# Patient Record
Sex: FEMALE | Race: AMERICAN INDIAN OR ALASKA NATIVE
[De-identification: names, ages, dates, MRNs, and addresses within clinical notes are randomized per-mention and may not be internally consistent; named-entity substitution may affect disease eponyms.]

---

## 2018-12-30 ENCOUNTER — HOSPITAL ENCOUNTER (EMERGENCY)
Dept: HOSPITAL 5 - ED | Age: 38
Discharge: HOME | End: 2018-12-30
Payer: MEDICAID

## 2018-12-30 VITALS — DIASTOLIC BLOOD PRESSURE: 72 MMHG | SYSTOLIC BLOOD PRESSURE: 114 MMHG

## 2018-12-30 DIAGNOSIS — Z88.0: ICD-10-CM

## 2018-12-30 DIAGNOSIS — F17.200: ICD-10-CM

## 2018-12-30 DIAGNOSIS — M17.0: ICD-10-CM

## 2018-12-30 DIAGNOSIS — Z91.048: ICD-10-CM

## 2018-12-30 DIAGNOSIS — Z88.6: ICD-10-CM

## 2018-12-30 DIAGNOSIS — S67.01XA: ICD-10-CM
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
## 2018-12-30 DIAGNOSIS — S61.011A: Primary | ICD-10-CM

## 2018-12-30 DIAGNOSIS — Z88.2: ICD-10-CM
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
## 2018-12-30 NOTE — EMERGENCY DEPARTMENT REPORT
ED Extremity Problem HPI





- General


Chief complaint: Extremity Injury, Upper


Stated complaint: RT THUMB SLAMMED IN DOOR


Time Seen by Provider: 18 12:57


Source: patient


Mode of arrival: Ambulatory


Limitations: No Limitations





- History of Present Illness


Initial comments: 





accidently slammed thumb in door





MD Complaint: extremity pain, extremity swelling, joint paint


-: Sudden, Last night (around 18:30 c/o of continued throbbing pain and some 

bleeding off and on. )


Location: right


History of Same: No





- Related Data


                                Home Medications











 Medication  Instructions  Recorded  Confirmed  Last Taken


 


Norethindrone [Desirae] 0.35 mg PO QDAY 16 13:00








                                  Previous Rx's











 Medication  Instructions  Recorded  Last Taken  Type


 


Acetaminophen/Codeine [Tylenol #3] 1 tab PO Q6H PRN #15 tab 16 Unknown Rx


 


valACYclovir [Valtrex] 500 mg PO BID #6 tab 16 Unknown Rx


 


Clindamycin [Clindamycin CAP] 150 mg PO Q6HR #40 capsule 18 Unknown Rx











                                    Allergies











Allergy/AdvReac Type Severity Reaction Status Date / Time


 


aspirin Allergy  Swelling Verified 16 11:02


 


ibuprofen [From Motrin] Allergy  Swelling Verified 16 11:02


 


methylprednisolone Allergy  Hives Verified 18 11:43





[From Depo-Medrol]     


 


penicillin Allergy  Shortness Verified 16 11:02





   of Breath  


 


RUBBING ALCOHOL AdvReac  Rash Uncoded 16 11:02














ED Review of Systems


ROS: 


Stated complaint: RT THUMB SLAMMED IN DOOR


Other details as noted in HPI





Constitutional: denies: chills, fever


Eyes: denies: eye pain, eye discharge, vision change


ENT: denies: ear pain, throat pain


Respiratory: denies: cough, shortness of breath, wheezing


Cardiovascular: denies: chest pain, palpitations


Endocrine: no symptoms reported


Gastrointestinal: denies: abdominal pain, nausea, diarrhea


Genitourinary: denies: urgency, dysuria, discharge


Musculoskeletal: arthralgia.  denies: back pain, joint swelling


Skin: denies: rash, lesions


Neurological: denies: headache, weakness, paresthesias


Psychiatric: denies: anxiety, depression


Hematological/Lymphatic: denies: easy bleeding, easy bruising





ED Past Medical Hx





- Past Medical History


Hx Arthritis: Yes (KNEES)


Additional medical history: DDD.  SCIATICA.  "NERVE DAMAGE".  HERPES





- Surgical History


Additional Surgical History:  X 4.  LEFT KNEE X 2.  RIGHT KNEE





- Social History


Smoking Status: Current Some Day Smoker


Substance Use Type: Alcohol





- Medications


Home Medications: 


                                Home Medications











 Medication  Instructions  Recorded  Confirmed  Last Taken  Type


 


Acetaminophen/Codeine [Tylenol #3] 1 tab PO Q6H PRN #15 tab 16  Unknown Rx


 


Norethindrone [Desirae] 0.35 mg PO QDAY 16 13:00 History


 


valACYclovir [Valtrex] 500 mg PO BID #6 tab 16  Unknown Rx


 


Clindamycin [Clindamycin CAP] 150 mg PO Q6HR #40 capsule 18  Unknown Rx














ED Physical Exam





- General


Limitations: No Limitations


General appearance: alert, in no apparent distress





- Head


Head exam: Present: atraumatic, normocephalic





- Eye


Eye exam: Present: normal appearance





- ENT


ENT exam: Present: mucous membranes moist





- Neck


Neck exam: Present: normal inspection





- Respiratory


Respiratory exam: Present: normal lung sounds bilaterally.  Absent: respiratory 

distress, wheezes, rales, rhonchi, chest wall tenderness, decreased breath 

sounds





- Cardiovascular


Cardiovascular Exam: Present: regular rate, normal rhythm.  Absent: systolic 

murmur, diastolic murmur, rubs, gallop





- GI/Abdominal


GI/Abdominal exam: Present: soft, normal bowel sounds





- Extremities Exam


Extremities exam: Present: normal inspection, tenderness, normal capillary 

refill, other (swollen left thumb with less than 1 cm tear laceration to 

latgeral aspect. full rom. no limitation. cap refill brisk. )





- Back Exam


Back exam: Present: normal inspection.  Absent: CVA tenderness (R), CVA 

tenderness (L)





- Neurological Exam


Neurological exam: Present: alert, oriented X3, CN II-XII intact





- Psychiatric


Psychiatric exam: Present: normal affect, normal mood





- Skin


Skin exam: Present: warm, dry, intact, normal color.  Absent: rash





ED Course





                                   Vital Signs











  18





  11:44


 


Temperature 98.4 F


 


Pulse Rate 72


 


Respiratory 18





Rate 


 


Blood Pressure 114/72


 


O2 Sat by Pulse 100





Oximetry 














- Reevaluation(s)


Reevaluation #1: 





18 13:10


thmb splinted and dressed wtih antimicrobal dressing. 


Critical care attestation.: 


If time is entered above; I have spent that time in minutes in the direct care 

of this critically ill patient, excluding procedure time.








ED Disposition


Clinical Impression: 


 Crush injury, Laceration





Disposition: DC-01 TO HOME OR SELFCARE


Is pt being admited?: No


Does the pt Need Aspirin: No


Condition: Stable


Instructions:  Laceration (ED), Finger Laceration (ED), Splint Care (ED)


Referrals: 


AARTI SHETTY MD [Primary Care Provider] - 3-5 Days

## 2018-12-30 NOTE — XRAY REPORT
FINAL REPORT



EXAM:  XR FINGER(S) 2+V RT



HISTORY:  PAIN IN RT THUMB/TRAUMA 



TECHNIQUE:  Frontal radiograph of the right hand including oblique and lateral radiographs of the rig
ht thumb.



PRIORS:  None.



FINDINGS:  

There is slight deformity of the tuft of the right thumb. No dislocation. Normal mineralization. Ther
e is soft tissue swelling of the right thumb. Overlying bandage material is present. No degenerative 
change. 



IMPRESSION:  

Possible nondisplaced right thumb tuft fracture.

## 2022-02-06 ENCOUNTER — HOSPITAL ENCOUNTER (EMERGENCY)
Dept: HOSPITAL 5 - ED | Age: 42
Discharge: HOME | End: 2022-02-06
Payer: MEDICAID

## 2022-02-06 VITALS — DIASTOLIC BLOOD PRESSURE: 62 MMHG | SYSTOLIC BLOOD PRESSURE: 109 MMHG

## 2022-02-06 DIAGNOSIS — O26.891: Primary | ICD-10-CM

## 2022-02-06 DIAGNOSIS — F17.200: ICD-10-CM

## 2022-02-06 DIAGNOSIS — Z88.0: ICD-10-CM

## 2022-02-06 DIAGNOSIS — Z88.6: ICD-10-CM

## 2022-02-06 DIAGNOSIS — R10.13: ICD-10-CM

## 2022-02-06 DIAGNOSIS — Z3A.01: ICD-10-CM

## 2022-02-06 LAB
ALBUMIN SERPL-MCNC: 4 G/DL (ref 3.9–5)
ALT SERPL-CCNC: 17 UNITS/L (ref 7–56)
BASOPHILS # (AUTO): 0 K/MM3 (ref 0–0.1)
BASOPHILS NFR BLD AUTO: 0.7 % (ref 0–1.8)
BILIRUB UR QL STRIP: (no result)
BLOOD UR QL VISUAL: (no result)
BUN SERPL-MCNC: 12 MG/DL (ref 7–17)
BUN/CREAT SERPL: 15 %
CALCIUM SERPL-MCNC: 9.2 MG/DL (ref 8.4–10.2)
EOSINOPHIL # BLD AUTO: 0 K/MM3 (ref 0–0.4)
EOSINOPHIL NFR BLD AUTO: 1 % (ref 0–4.3)
HCT VFR BLD CALC: 35.5 % (ref 30.3–42.9)
HEMOLYSIS INDEX: 2
HGB BLD-MCNC: 11.2 GM/DL (ref 10.1–14.3)
LYMPHOCYTES # BLD AUTO: 1.4 K/MM3 (ref 1.2–5.4)
LYMPHOCYTES NFR BLD AUTO: 34.9 % (ref 13.4–35)
MCHC RBC AUTO-ENTMCNC: 32 % (ref 30–34)
MCV RBC AUTO: 75 FL (ref 79–97)
MONOCYTES # (AUTO): 0.3 K/MM3 (ref 0–0.8)
MONOCYTES % (AUTO): 7.9 % (ref 0–7.3)
MUCOUS THREADS #/AREA URNS HPF: (no result) /HPF
PH UR STRIP: 5 [PH] (ref 5–7)
PLATELET # BLD: 238 K/MM3 (ref 140–440)
PROT UR STRIP-MCNC: (no result) MG/DL
RBC # BLD AUTO: 4.75 M/MM3 (ref 3.65–5.03)
RBC #/AREA URNS HPF: < 1 /HPF (ref 0–6)
UROBILINOGEN UR-MCNC: < 2 MG/DL (ref ?–2)
WBC #/AREA URNS HPF: 2 /HPF (ref 0–6)

## 2022-02-06 PROCEDURE — 85025 COMPLETE CBC W/AUTO DIFF WBC: CPT

## 2022-02-06 PROCEDURE — 36415 COLL VENOUS BLD VENIPUNCTURE: CPT

## 2022-02-06 PROCEDURE — 83690 ASSAY OF LIPASE: CPT

## 2022-02-06 PROCEDURE — 84703 CHORIONIC GONADOTROPIN ASSAY: CPT

## 2022-02-06 PROCEDURE — 81001 URINALYSIS AUTO W/SCOPE: CPT

## 2022-02-06 PROCEDURE — 99284 EMERGENCY DEPT VISIT MOD MDM: CPT

## 2022-02-06 PROCEDURE — 80053 COMPREHEN METABOLIC PANEL: CPT

## 2022-02-06 PROCEDURE — 84702 CHORIONIC GONADOTROPIN TEST: CPT

## 2022-02-06 PROCEDURE — 76801 OB US < 14 WKS SINGLE FETUS: CPT

## 2022-02-06 NOTE — EMERGENCY DEPARTMENT REPORT
ED General Adult HPI





- General


Chief complaint: Abdominal Pain


Stated complaint: AB PAIN/NAUSEA/FATIGUE


Time Seen by Provider: 22 08:15


Source: patient


Mode of arrival: Ambulatory


Limitations: No Limitations





- History of Present Illness


Initial comments: 





41-year-old -American female patient with history of bipolar disorder 

presents with complaints of abdominal pain, bloating, and nausea x2 days.  She 

also reports that she took several pregnancy tests that came back inconclusive. 

She denies any vaginal bleeding, dyspareunia, vaginal discharge, 

hematuria/urinary frequency, vomiting, melena, hematochezia, or diarrhea.  No 

chest pain or cough/shortness of breath per patient.  She has not tried any OTC 

medications for symptoms.  Patient also denies any fever/chills/sweats


Severity scale (0 -10): 7





- Related Data


                                Home Medications











 Medication  Instructions  Recorded  Confirmed  Last Taken


 


Norethindrone [Desirae] 0.35 mg PO QDAY 16 13:00








                                  Previous Rx's











 Medication  Instructions  Recorded  Last Taken  Type


 


Acetaminophen/Codeine [Tylenol #3] 1 tab PO Q6H PRN #15 tab 16 Unknown Rx


 


valACYclovir [Valtrex] 500 mg PO BID #6 tab 16 Unknown Rx


 


Clindamycin [Clindamycin CAP] 150 mg PO Q6HR #40 capsule 18 Unknown Rx


 


Famotidine [Pepcid] 20 mg PO BID PRN #20 tablet 22 Unknown Rx


 


Metoclopramide [Reglan] 10 mg PO TID PRN #30 tab 22 Unknown Rx


 


diphenhydrAMINE [Benadryl CAP] 25 mg PO TID PRN #30 capsule 22 Unknown Rx











                                    Allergies











Allergy/AdvReac Type Severity Reaction Status Date / Time


 


aspirin Allergy  Swelling Verified 16 11:02


 


ibuprofen [From Motrin] Allergy  Swelling Verified 16 11:02


 


methylprednisolone Allergy  Hives Verified 18 11:43





[From Depo-Medrol]     


 


penicillin Allergy  Shortness Verified 16 11:02





   of Breath  


 


RUBBING ALCOHOL AdvReac  Rash Uncoded 16 11:02














ED Review of Systems


ROS: 


Stated complaint: AB PAIN/NAUSEA/FATIGUE


Other details as noted in HPI





Constitutional: denies: chills, fever, malaise


Respiratory: denies: cough, shortness of breath


Cardiovascular: denies: chest pain


Gastrointestinal: abdominal pain, nausea.  denies: vomiting, diarrhea, 

constipation, hematemesis, melena, hematochezia


Genitourinary: frequency.  denies: urgency, dysuria, hematuria, discharge, 

abnormal menses


Skin: denies: rash, lesions, change in color


Neurological: denies: headache


Hematological/Lymphatic: denies: easy bleeding, swollen glands





ED Past Medical Hx





- Past Medical History


Previous Medical History?: Yes


Hx Arthritis: Yes (KNEES)


Hx Psychiatric Treatment: Yes (Bi-Polar 2)


Additional medical history: DDD.  SCIATICA.  "NERVE DAMAGE".  HERPES.  herniated

discs





- Surgical History


Past Surgical History?: Yes


Additional Surgical History:  X 4.  LEFT KNEE X 2.  RIGHT KNEE





- Social History


Smoking Status: Current Some Day Smoker


Substance Use Type: Alcohol





- Medications


Home Medications: 


                                Home Medications











 Medication  Instructions  Recorded  Confirmed  Last Taken  Type


 


Acetaminophen/Codeine [Tylenol #3] 1 tab PO Q6H PRN #15 tab 16  Unknown Rx


 


Norethindrone [Desirae] 0.35 mg PO QDAY 16 13:00 History


 


valACYclovir [Valtrex] 500 mg PO BID #6 tab 16  Unknown Rx


 


Clindamycin [Clindamycin CAP] 150 mg PO Q6HR #40 capsule 18  Unknown Rx


 


Famotidine [Pepcid] 20 mg PO BID PRN #20 tablet 22  Unknown Rx


 


Metoclopramide [Reglan] 10 mg PO TID PRN #30 tab 22  Unknown Rx


 


diphenhydrAMINE [Benadryl CAP] 25 mg PO TID PRN #30 capsule 22  Unknown Rx














ED Physical Exam





- General


Limitations: No Limitations


General appearance: alert, in no apparent distress, obese





- Head


Head exam: Present: atraumatic, normocephalic





- Eye


Eye exam: Present: normal appearance





- Neck


Neck exam: Present: normal inspection





- Respiratory


Respiratory exam: Present: normal lung sounds bilaterally.  Absent: respiratory 

distress





- Cardiovascular


Cardiovascular Exam: Present: regular rate, normal rhythm





- GI/Abdominal


GI/Abdominal exam: Present: soft, tenderness (Epigastric and periumbilical), 

normal bowel sounds.  Absent: distended, guarding, rebound, rigid, other 

(Negative Napoles sign; no McBurney point tenderness)





- Back Exam


Back exam: Present: full ROM.  Absent: CVA tenderness (R), CVA tenderness (L)





- Neurological Exam


Neurological exam: Present: alert, oriented X3, normal gait





- Psychiatric


Psychiatric exam: Present: normal affect, normal mood





- Skin


Skin exam: Present: warm, dry, intact, normal color.  Absent: rash





ED Course


                                   Vital Signs











  22





  08:07


 


Temperature 98.5 F


 


Pulse Rate 74


 


Respiratory 16





Rate 


 


Blood Pressure 124/77





[Right] 


 


O2 Sat by Pulse 100





Oximetry 














ED Medical Decision Making





- Lab Data


Result diagrams: 


                                 22 08:34





                                 22 08:34








                                   Lab Results











  22 Range/Units





  08:34 08:34 08:34 


 


WBC  4.0 L    (4.5-11.0)  K/mm3


 


RBC  4.75    (3.65-5.03)  M/mm3


 


Hgb  11.2    (10.1-14.3)  gm/dl


 


Hct  35.5    (30.3-42.9)  %


 


MCV  75 L    (79-97)  fl


 


MCH  24 L    (28-32)  pg


 


MCHC  32    (30-34)  %


 


RDW  15.9 H    (13.2-15.2)  %


 


Plt Count  238    (140-440)  K/mm3


 


Lymph % (Auto)  34.9    (13.4-35.0)  %


 


Mono % (Auto)  7.9 H    (0.0-7.3)  %


 


Eos % (Auto)  1.0    (0.0-4.3)  %


 


Baso % (Auto)  0.7    (0.0-1.8)  %


 


Lymph # (Auto)  1.4    (1.2-5.4)  K/mm3


 


Mono # (Auto)  0.3    (0.0-0.8)  K/mm3


 


Eos # (Auto)  0.0    (0.0-0.4)  K/mm3


 


Baso # (Auto)  0.0    (0.0-0.1)  K/mm3


 


Seg Neutrophils %  55.5    (40.0-70.0)  %


 


Seg Neutrophils #  2.2    (1.8-7.7)  K/mm3


 


Sodium   134 L   (137-145)  mmol/L


 


Potassium   4.4   (3.6-5.0)  mmol/L


 


Chloride   103.9   ()  mmol/L


 


Carbon Dioxide   20 L   (22-30)  mmol/L


 


Anion Gap   15   mmol/L


 


BUN   12   (7-17)  mg/dL


 


Creatinine   0.8   (0.6-1.2)  mg/dL


 


Estimated GFR   > 60   ml/min


 


BUN/Creatinine Ratio   15   %


 


Glucose   90   ()  mg/dL


 


Calcium   9.2   (8.4-10.2)  mg/dL


 


Total Bilirubin   0.20   (0.1-1.2)  mg/dL


 


AST   18   (5-40)  units/L


 


ALT   17   (7-56)  units/L


 


Alkaline Phosphatase   44   ()  units/L


 


Total Protein   6.9   (6.3-8.2)  g/dL


 


Albumin   4.0   (3.9-5)  g/dL


 


Albumin/Globulin Ratio   1.4   %


 


Lipase   39   (13-60)  units/L


 


HCG, Qual    Positive  (Negative)  


 


HCG, Quant     (0-4)  mIU/mL


 


Urine Color     (Yellow)  


 


Urine Turbidity     (Clear)  


 


Urine pH     (5.0-7.0)  


 


Ur Specific Gravity     (1.003-1.030)  


 


Urine Protein     (Negative)  mg/dL


 


Urine Glucose (UA)     (Negative)  mg/dL


 


Urine Ketones     (Negative)  mg/dL


 


Urine Blood     (Negative)  


 


Urine Nitrite     (Negative)  


 


Urine Bilirubin     (Negative)  


 


Urine Urobilinogen     (<2.0)  mg/dL


 


Ur Leukocyte Esterase     (Negative)  


 


Urine WBC (Auto)     (0.0-6.0)  /HPF


 


Urine RBC (Auto)     (0.0-6.0)  /HPF


 


U Epithel Cells (Auto)     (0-13.0)  /HPF


 


Urine Mucus     /HPF














  22 Range/Units





  08:34 Unknown 


 


WBC    (4.5-11.0)  K/mm3


 


RBC    (3.65-5.03)  M/mm3


 


Hgb    (10.1-14.3)  gm/dl


 


Hct    (30.3-42.9)  %


 


MCV    (79-97)  fl


 


MCH    (28-32)  pg


 


MCHC    (30-34)  %


 


RDW    (13.2-15.2)  %


 


Plt Count    (140-440)  K/mm3


 


Lymph % (Auto)    (13.4-35.0)  %


 


Mono % (Auto)    (0.0-7.3)  %


 


Eos % (Auto)    (0.0-4.3)  %


 


Baso % (Auto)    (0.0-1.8)  %


 


Lymph # (Auto)    (1.2-5.4)  K/mm3


 


Mono # (Auto)    (0.0-0.8)  K/mm3


 


Eos # (Auto)    (0.0-0.4)  K/mm3


 


Baso # (Auto)    (0.0-0.1)  K/mm3


 


Seg Neutrophils %    (40.0-70.0)  %


 


Seg Neutrophils #    (1.8-7.7)  K/mm3


 


Sodium    (137-145)  mmol/L


 


Potassium    (3.6-5.0)  mmol/L


 


Chloride    ()  mmol/L


 


Carbon Dioxide    (22-30)  mmol/L


 


Anion Gap    mmol/L


 


BUN    (7-17)  mg/dL


 


Creatinine    (0.6-1.2)  mg/dL


 


Estimated GFR    ml/min


 


BUN/Creatinine Ratio    %


 


Glucose    ()  mg/dL


 


Calcium    (8.4-10.2)  mg/dL


 


Total Bilirubin    (0.1-1.2)  mg/dL


 


AST    (5-40)  units/L


 


ALT    (7-56)  units/L


 


Alkaline Phosphatase    ()  units/L


 


Total Protein    (6.3-8.2)  g/dL


 


Albumin    (3.9-5)  g/dL


 


Albumin/Globulin Ratio    %


 


Lipase    (13-60)  units/L


 


HCG, Qual    (Negative)  


 


HCG, Quant  34023 H   (0-4)  mIU/mL


 


Urine Color   Yellow  (Yellow)  


 


Urine Turbidity   Clear  (Clear)  


 


Urine pH   5.0  (5.0-7.0)  


 


Ur Specific Gravity   1.029  (1.003-1.030)  


 


Urine Protein   <15 mg/dl  (Negative)  mg/dL


 


Urine Glucose (UA)   Neg  (Negative)  mg/dL


 


Urine Ketones   Tr  (Negative)  mg/dL


 


Urine Blood   Neg  (Negative)  


 


Urine Nitrite   Neg  (Negative)  


 


Urine Bilirubin   Neg  (Negative)  


 


Urine Urobilinogen   < 2.0  (<2.0)  mg/dL


 


Ur Leukocyte Esterase   Neg  (Negative)  


 


Urine WBC (Auto)   2.0  (0.0-6.0)  /HPF


 


Urine RBC (Auto)   < 1.0  (0.0-6.0)  /HPF


 


U Epithel Cells (Auto)   4.0  (0-13.0)  /HPF


 


Urine Mucus   3+  /HPF














- Radiology Data


Radiology results: report reviewed








 


ULTRASOUND OBSTETRIC   


 


 INDICATION / CLINICAL INFORMATION: pain in pregnancy. Pelvic pain/cramping.  


 - Clinical Gestational Age (GA) in weeks, days: 6, 5   


 


 TECHNIQUE: Transabdominal.  


 


 COMPARISON: None available.  


 


 FINDINGS:  


 GESTATIONAL SAC: Well-defined oval shape and intrauterine in location.   


 YOLK SAC: No significant abnormality.  


 


 EMBRYO/FETUS: No significant abnormality.   


 - Crown-Rump Length = 0.7 cm = 6, 4 weeks, days  


 - Fetal Heart Rate, beats per minute (if present) = 139  


 


 ADNEXA: No significant abnormality.  


 FREE FLUID: None.  


 


 ADDITIONAL FINDINGS: None.  


 


 IMPRESSION:  


 1. Single, living intrauterine pregnancy with estimated sonographic age of 6, 4

weeks, days.  No 


acute sonographic abnormality.  


 





- Medical Decision Making








41-year-old -American female patient with history of bipolar disorder 

presents with complaints of abdominal pain, bloating, and nausea x2 days.  She 

also reports that she took several pregnancy tests that came back inconclusive. 

She denies any vaginal bleeding, dyspareunia, vaginal discharge, 

hematuria/urinary frequency, vomiting, melena, hematochezia, or diarrhea.  No 

chest pain or cough/shortness of breath per patient.  She has not tried any OTC 

medications for symptoms.  Patient also denies any fever/chills/sweats








Positive pregnancy test.  Ultrasound shows 6-week 4-day IUP.  Mildly decreased 

sodium on CMP without other acute abnormalities.  Patient declines fluids and 

meds.  Vitals are within normal limits and patient is well-appearing.  She is 

stable for discharge home.  Reglan Benadryl given for home for nausea.  Patient 

given referrals for OB/GYN and informed to follow-up within 1 to 2 weeks.  

Strict return precautions were discussed in detail with patient who verbalizes 

understanding


Critical care attestation.: 


If time is entered above; I have spent that time in minutes in the direct care 

of this critically ill patient, excluding procedure time.








ED Disposition


Clinical Impression: 


 Epigastric abdominal pain during pregnancy in first trimester





Disposition:  HOME / SELF CARE / HOMELESS


Is pt being admited?: No


Condition: Stable


Instructions:  Abdominal Pain (ED), Abdominal Pain During Pregnancy, Activity 

Restriction During Pregnancy


Prescriptions: 


diphenhydrAMINE [Benadryl CAP] 25 mg PO TID PRN #30 capsule


 PRN Reason: Nausea


Famotidine [Pepcid] 20 mg PO BID PRN #20 tablet


 PRN Reason: Heartburn


Metoclopramide [Reglan] 10 mg PO TID PRN #30 tab


 PRN Reason: Nausea


Referrals: 


[facility] [Primary Care Provider] - 3-5 Days


[facility] [Provider Group] - 3-5 Days


[facility] [Provider Group] - 3-5 Days


[facility] [Provider Group] - 3-5 Days


Forms:  Work/School Release Form(ED)

## 2022-02-06 NOTE — ULTRASOUND REPORT
ULTRASOUND OBSTETRIC 



INDICATION / CLINICAL INFORMATION: pain in pregnancy. Pelvic pain/cramping.

- Clinical Gestational Age (GA) in weeks, days: 6, 5 



TECHNIQUE: Transabdominal.



COMPARISON: None available.



FINDINGS:

GESTATIONAL SAC: Well-defined oval shape and intrauterine in location. 

YOLK SAC: No significant abnormality.



EMBRYO/FETUS: No significant abnormality. 

- Crown-Rump Length = 0.7 cm = 6, 4 weeks, days

- Fetal Heart Rate, beats per minute (if present) = 139



ADNEXA: No significant abnormality.

FREE FLUID: None.



ADDITIONAL FINDINGS: None.



IMPRESSION:

1. Single, living intrauterine pregnancy with estimated sonographic age of 6, 4 weeks, days.  No acut
e sonographic abnormality.



Signer Name: CT Brown MD 

Signed: 2/6/2022 12:43 PM

Workstation Name: [id number]

## 2022-09-13 ENCOUNTER — HOSPITAL ENCOUNTER (EMERGENCY)
Dept: HOSPITAL 5 - ED | Age: 42
LOS: 1 days | Discharge: HOME | End: 2022-09-14
Payer: MEDICAID

## 2022-09-13 DIAGNOSIS — M25.561: Primary | ICD-10-CM

## 2022-09-13 DIAGNOSIS — Z88.6: ICD-10-CM

## 2022-09-13 DIAGNOSIS — F10.20: ICD-10-CM

## 2022-09-13 DIAGNOSIS — Z88.0: ICD-10-CM

## 2022-09-13 DIAGNOSIS — F17.200: ICD-10-CM

## 2022-09-13 DIAGNOSIS — Z88.8: ICD-10-CM

## 2022-09-13 PROCEDURE — 99283 EMERGENCY DEPT VISIT LOW MDM: CPT

## 2022-09-14 VITALS — SYSTOLIC BLOOD PRESSURE: 132 MMHG | DIASTOLIC BLOOD PRESSURE: 91 MMHG

## 2022-09-14 NOTE — EMERGENCY DEPARTMENT REPORT
ED General Adult HPI





- General


Chief complaint: Extremity Injury, Lower


Stated complaint: right leg pain


Time Seen by Provider: 22 04:53


Source: patient, EMS


Mode of arrival: Stretcher


Limitations: No Limitations





- History of Present Illness


Initial comments: 


Patient 42-year-old  with history of arthralgia and chronic bilateral

knee pain who presents for right anterior knee pain x3 days.  Patient denies new

fall injury or trauma.  The pain is described at 4/10 exacerbated by prolonged 

standing bending and twisting.  Patient states previously treated with steroids 

and Tylenol 3 for same.  Requesting referral to orthopedics as last  Is no 

longer taking her insurance plan.  Has other complaint.  Patient arrived to ED 

via POV.  Patient is ambulatory with minimal gait disturbance at this time.  

There are no abrasions abrasions lacerations or bleeding.  There is no numbness 

tingling or paralysis.





- Related Data


                                Home Medications











 Medication  Instructions  Recorded  Confirmed  Last Taken


 


Norethindrone [Desirae] 0.35 mg PO QDAY 16 13:00








                                  Previous Rx's











 Medication  Instructions  Recorded  Last Taken  Type


 


Acetaminophen/Codeine [Tylenol #3] 1 tab PO Q6H PRN #15 tab 16 Unknown Rx


 


valACYclovir [Valtrex] 500 mg PO BID #6 tab 16 Unknown Rx


 


Clindamycin [Clindamycin CAP] 150 mg PO Q6HR #40 capsule 18 Unknown Rx


 


Famotidine [Pepcid] 20 mg PO BID PRN #20 tablet 22 Unknown Rx


 


Metoclopramide [Reglan] 10 mg PO TID PRN #30 tab 22 Unknown Rx


 


diphenhydrAMINE [Benadryl CAP] 25 mg PO TID PRN #30 capsule 22 Unknown Rx


 


Acetaminophen/Codeine [Tylenol 1 tab PO Q6H PRN 4 Days #12 tab 22 Unknown 

Rx





/Codeine # 3 tab]    


 


Menthol/Camphor [Tiger Balm 1 applicatio TP QID PRN #1 tube 22 Unknown Rx





Ointment]    











                                    Allergies











Allergy/AdvReac Type Severity Reaction Status Date / Time


 


aspirin Allergy  Swelling Verified 16 11:02


 


ibuprofen [From Motrin] Allergy  Swelling Verified 16 11:02


 


methylprednisolone Allergy  Hives Verified 18 11:43





[From Depo-Medrol]     


 


penicillin Allergy  Shortness Verified 16 11:02





   of Breath  


 


RUBBING ALCOHOL AdvReac  Rash Uncoded 16 11:02














ED Review of Systems


ROS: 


Stated complaint: right leg pain


Other details as noted in HPI





Constitutional: denies: chills, fever


Eyes: denies: eye pain, eye discharge, vision change


ENT: denies: ear pain, throat pain


Respiratory: denies: cough, shortness of breath, wheezing


Cardiovascular: denies: chest pain, palpitations


Endocrine: no symptoms reported


Gastrointestinal: denies: abdominal pain, nausea, diarrhea


Genitourinary: denies: urgency, dysuria, discharge


Musculoskeletal: arthralgia (Right knee pain).  denies: joint swelling


Skin: denies: rash, lesions


Neurological: denies: headache, weakness, paresthesias


Psychiatric: denies: anxiety, depression


Hematological/Lymphatic: denies: easy bleeding, easy bruising





ED Past Medical Hx





- Past Medical History


Hx Arthritis: Yes (KNEES)


Hx Psychiatric Treatment: Yes (Bi-Polar 2)


Additional medical history: DDD.  SCIATICA.  "NERVE DAMAGE".  HERPES.  herniated

discs





- Surgical History


Additional Surgical History:  X 4.  LEFT KNEE X 2.  RIGHT KNEE





- Social History


Smoking Status: Current Some Day Smoker


Substance Use Type: Alcohol





- Medications


Home Medications: 


                                Home Medications











 Medication  Instructions  Recorded  Confirmed  Last Taken  Type


 


Acetaminophen/Codeine [Tylenol #3] 1 tab PO Q6H PRN #15 tab 16  Unknown Rx


 


Norethindrone [Desirae] 0.35 mg PO QDAY 16 13:00 History


 


valACYclovir [Valtrex] 500 mg PO BID #6 tab 16  Unknown Rx


 


Clindamycin [Clindamycin CAP] 150 mg PO Q6HR #40 capsule 18  Unknown Rx


 


Famotidine [Pepcid] 20 mg PO BID PRN #20 tablet 22  Unknown Rx


 


Metoclopramide [Reglan] 10 mg PO TID PRN #30 tab 22  Unknown Rx


 


diphenhydrAMINE [Benadryl CAP] 25 mg PO TID PRN #30 capsule 22  Unknown Rx


 


Acetaminophen/Codeine [Tylenol 1 tab PO Q6H PRN 4 Days #12 tab 22  Unknown

 Rx





/Codeine # 3 tab]     


 


Menthol/Camphor [Tiger Balm 1 applicatio TP QID PRN #1 tube 22  Unknown Rx





Ointment]     














ED Physical Exam





- General


Limitations: No Limitations


General appearance: alert, in no apparent distress





- Head


Head exam: Present: atraumatic, normocephalic





- Eye


Eye exam: Present: normal appearance, EOMI


Pupils: Present: normal accommodation





- ENT


ENT exam: Present: mucous membranes moist





- Neck


Neck exam: Present: normal inspection, full ROM.  Absent: tenderness, 

lymphadenopathy





- Respiratory


Respiratory exam: Present: normal lung sounds bilaterally.  Absent: respiratory 

distress, wheezes





- Cardiovascular


Cardiovascular Exam: Present: regular rate, normal rhythm, normal heart sounds. 

Absent: systolic murmur, diastolic murmur, rubs, gallop





- GI/Abdominal


GI/Abdominal exam: Present: soft, normal bowel sounds.  Absent: distended, 

tenderness





- Rectal


Rectal exam: Present: deferred





- Extremities Exam


Extremities exam: Present: normal inspection, full ROM, normal capillary refill





- Expanded Lower Extremity Exam


  ** Right


Knee exam: Present: full ROM, tenderness (Anterior knee pain with deep palpation

there is no anterior drawer no clicking popping Mild pain with limitation.), 

pain w/ pronation/supination, full knee extension.  Absent: swelling, abrasion, 

laceration, ecchymosis, deformity, crepidus, dislocation, erythema, effusion, 

posterior draw sign


Lower Leg exam: Present: full ROM.  Absent: tenderness, swelling


Neuro vascular tendon exam: Present: no vascular compromise.  Absent: pulse 

deficit, motor deficit, sensory deficit, tendon deficit


Gait: Positive: observed and normal





- Back Exam


Back exam: Present: normal inspection, full ROM.  Absent: CVA tenderness (R), 

CVA tenderness (L)





- Neurological Exam


Neurological exam: Present: alert, oriented X3, CN II-XII intact, reflexes 

normal.  Absent: motor sensory deficit





- Expanded Neurological Exam


  ** Expanded


Patient oriented to: Present: person, place, time


Speech: Present: fluid speech


Motor strength exam: RUE: 5, LUE: 5, RLE: 5, LLE: 5


DTR: knee (R): 1+, knee (L): 1+


Best Eye Response ([place]): (4) open spontaneously


Best Motor Response ([place]): (6) obeys commands


Best Verbal Response ([place]): (5) oriented


[place] Total: 15





- Psychiatric


Psychiatric exam: Present: normal affect, normal mood





- Skin


Skin exam: Present: warm, dry, intact, normal color.  Absent: rash





ED Course





                                   Vital Signs











  22





  22:39


 


Temperature 98.8 F


 


Pulse Rate 80


 


Respiratory 16





Rate 


 


Blood Pressure 130/90





[Right] 


 


O2 Sat by Pulse 100





Oximetry 














ED Medical Decision Making





- Medical Decision Making


This is acute on chronic knee pain, patient remains ambulatory.  There is no 

NaSal swelling ecchymosis crepitus or deformity.  Patient maintains full knee 

extension.  Ambulation is steady.  There is no catch pop anterior drawer or 

instability noted on exam.  Plan Tylenol as needed for pain as needed, follow-up

 with orthopedics as scheduled.  Return to emergency department should symptoms 

worsen.





Critical care attestation.: 


If time is entered above; I have spent that time in minutes in the direct care 

of this critically ill patient, excluding procedure time.








ED Disposition


Clinical Impression: 


 Arthralgia of knee, right





Disposition:  HOME / SELF CARE / HOMELESS


Is pt being admited?: No


Does the pt Need Aspirin: No


Condition: Stable


Instructions:  Joint Pain, Easy-to-Read


Additional Instructions: 


Take medication as prescribed, rice therapy as directed.  Follow-up with 

orthopedics in 2 to 3 days.  Return to emergency department should symptoms 

worsen.


Prescriptions: 


Menthol/Camphor [Tiger Balm Ointment] 1 applicatio TP QID PRN #1 tube


 PRN Reason: pain


Acetaminophen/Codeine [Tylenol /Codeine # 3 tab] 1 tab PO Q6H PRN 4 Days #12 tab


 PRN Reason: Pain


Forms:  Work/School Release Form(ED)


Time of Disposition: 05:20